# Patient Record
Sex: FEMALE | Race: WHITE | Employment: STUDENT | URBAN - METROPOLITAN AREA
[De-identification: names, ages, dates, MRNs, and addresses within clinical notes are randomized per-mention and may not be internally consistent; named-entity substitution may affect disease eponyms.]

---
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
## 2025-01-03 ENCOUNTER — TELEPHONE (OUTPATIENT)
Dept: PHYSICAL THERAPY | Facility: HOSPITAL | Age: 20
End: 2025-01-03

## 2025-01-08 ENCOUNTER — OFFICE VISIT (OUTPATIENT)
Dept: PHYSICAL THERAPY | Facility: HOSPITAL | Age: 20
End: 2025-01-08
Attending: STUDENT IN AN ORGANIZED HEALTH CARE EDUCATION/TRAINING PROGRAM
Payer: MEDICAID

## 2025-01-08 PROCEDURE — 97112 NEUROMUSCULAR REEDUCATION: CPT | Performed by: PHYSICAL THERAPIST

## 2025-01-08 PROCEDURE — 97110 THERAPEUTIC EXERCISES: CPT | Performed by: PHYSICAL THERAPIST

## 2025-01-08 NOTE — PROGRESS NOTES
Diagnosis:   Patellar instability of right knee (M25.361)       Referring Provider: Nnamdi Iqbal  Date of Evaluation:    12/23/2024     Precautions:  None Next MD visit:   none scheduled  Date of Surgery: n/a   Insurance Primary/Secondary: BLUE CROSS MEDICAID / N/A     # Auth Visits: 10            Subjective: Patient reports no pain today, but still has difficulty bending, putting weight on it, going down the stairs, and squatting down.     Pain: 0/10      Objective:   1/8/25  R Knee flexion ROM:  130 degrees    Assessment: Patient's main complaint is still having difficulty WB in her RLE, squatting, and descending stairs. New exercises were added today to improve her glut and quad strength so that she can be able to WB more in her RLE so that she can perform above activities. She tolerated well with all exercises without increase in pain.       Goals:   Goals: (to be met in 8 visits)  Pt will improve knee extension ROM to 0 deg to allow proper heel strike during gait and terminal knee extension in stance   Pt will improve knee AROM flexion to >120 degrees to improve ability to perform squatting    Pt will improve quad strength to 5/5 to ascend 1 flight of stairs reciprocally without UE assist   Pt will increase hip and knee strength to grossly 4+/5 to be able to get up and down from the floor safely   Pt will demonstrate increased hip ER/ABD strength to 5/5 to perform stepping and squatting activities without excessive femoral IR/ADD   Pt will be independent and compliant with comprehensive HEP to maintain progress achieved in PT      Plan: Add hip and knee strengthening.   Date: 1/8/2025  TX#: 2/10 Date:                 TX#: 3/ Date:                 TX#: 4/ Date:                 TX#: 5/ Date:   Tx#: 6/   TE 30'  Upright Bike 5 min.   Shuttle DL 50#/SL 25# 3x10 ea   Step up & over 6 in step 3x10   PROM in the R Knee   Heel slides with strap [id number] R  TRX mini squats 3x10  Hurdles FWD/Side 6 laps   Clam shells  3x10 ea bilat YTB        NMR 15'  R SLS 3 way rebound throws 30x ea   R TKE with GTB 3x10   R SLR 3x10   Side steps 3 laps YTB                      HEP: Continue with HEP given at IE.     Charges: 2 TE, & 1 NMR       Total Timed Treatment: 45 min  Total Treatment Time: 45 min

## 2025-01-10 ENCOUNTER — OFFICE VISIT (OUTPATIENT)
Dept: PHYSICAL THERAPY | Facility: HOSPITAL | Age: 20
End: 2025-01-10
Attending: STUDENT IN AN ORGANIZED HEALTH CARE EDUCATION/TRAINING PROGRAM
Payer: MEDICAID

## 2025-01-10 PROCEDURE — 97110 THERAPEUTIC EXERCISES: CPT | Performed by: PHYSICAL THERAPIST

## 2025-01-10 PROCEDURE — 97112 NEUROMUSCULAR REEDUCATION: CPT | Performed by: PHYSICAL THERAPIST

## 2025-01-10 NOTE — PROGRESS NOTES
Diagnosis:   Patellar instability of right knee (M25.361)       Referring Provider: Nnamdi Iqbal  Date of Evaluation:    12/23/2024     Precautions:  None Next MD visit:   none scheduled  Date of Surgery: n/a   Insurance Primary/Secondary: BLUE CROSS MEDICAID / N/A     # Auth Visits: 10            Subjective: Patient reports no pain today. She states no pain with bending.     Pain: 0/10      Objective:     1/10/25  R Knee flexion ROM:  130 degrees  1/8/25  R Knee flexion ROM:  130 degrees    Assessment: New exercises and exercises were progressed today to further improve her glut and quad strength so that she can be able to ambulate and descend stairs with less difficulty. She tolerated well with all exercises without increase in pain.       Goals:   Goals: (to be met in 8 visits)  Pt will improve knee extension ROM to 0 deg to allow proper heel strike during gait and terminal knee extension in stance   Pt will improve knee AROM flexion to >120 degrees to improve ability to perform squatting    Pt will improve quad strength to 5/5 to ascend 1 flight of stairs reciprocally without UE assist   Pt will increase hip and knee strength to grossly 4+/5 to be able to get up and down from the floor safely   Pt will demonstrate increased hip ER/ABD strength to 5/5 to perform stepping and squatting activities without excessive femoral IR/ADD   Pt will be independent and compliant with comprehensive HEP to maintain progress achieved in PT      Plan: Add hip and knee strengthening.   Date: 1/8/2025  TX#: 2/10 Date: 1/10/25  TX#: 3/10 Date:                 TX#: 4/ Date:                 TX#: 5/ Date:   Tx#: 6/   TE 30'  Upright Bike 5 min.   Shuttle DL 50#/SL 25# 3x10 ea   Step up & over 6 in step 3x10   PROM in the R Knee   Heel slides with strap [id number] R  TRX mini squats 3x10  Hurdles FWD/Side 6 laps   Clam shells 3x10 ea bilat YTB  TE 30'  Upright Bike 5 min.   Shuttle DL 62#/SL 37# 3x10 ea   Lateral step down 4 in 3x10    Heel slides with strap [id number] R   TRX mini squats 3x10  Hurdles FWD/Side 6 laps   Clam shells 3x10 ea bilat RTB       NMR 15'  R SLS 3 way rebound throws 30x ea   R TKE with GTB 3x10   R SLR 3x10   Side steps 3 laps YTB  NMR 15'  R SLS + Blaze pods   R TKE with GTB 3x10   R SLR 3x10   Side steps 6 laps RTB  Step ups on Bosu ball side up FWD/Side 3x10 ea                      HEP: Continue with HEP given at IE.     Charges: 2 TE, & 1 NMR       Total Timed Treatment: 45 min  Total Treatment Time: 45 min

## 2025-01-15 ENCOUNTER — OFFICE VISIT (OUTPATIENT)
Dept: PHYSICAL THERAPY | Facility: HOSPITAL | Age: 20
End: 2025-01-15
Attending: STUDENT IN AN ORGANIZED HEALTH CARE EDUCATION/TRAINING PROGRAM
Payer: MEDICAID

## 2025-01-15 PROCEDURE — 97110 THERAPEUTIC EXERCISES: CPT | Performed by: PHYSICAL THERAPIST

## 2025-01-15 PROCEDURE — 97530 THERAPEUTIC ACTIVITIES: CPT | Performed by: PHYSICAL THERAPIST

## 2025-01-15 PROCEDURE — 97112 NEUROMUSCULAR REEDUCATION: CPT | Performed by: PHYSICAL THERAPIST

## 2025-01-15 NOTE — PROGRESS NOTES
Diagnosis:   Patellar instability of right knee (M25.361)       Referring Provider: Nnamdi Iqbal  Date of Evaluation:    12/23/2024     Precautions:  None Next MD visit:   none scheduled  Date of Surgery: n/a   Insurance Primary/Secondary: BLUE CROSS MEDICAID / N/A     # Auth Visits: 10            Subjective: Patient reports overall feeling \"75% better\".     Pain: 0/10      Objective:     1/10/25  R Knee flexion ROM:  130 degrees  1/8/25  R Knee flexion ROM:  130 degrees    Assessment: Patient has benefited well in PT is now able to achieve 130 degrees of knee flexion without pain. However, she is still having pain with deep squatting, lunging, and sitting for prolonged periods of time. She would also like to return back to running in the near future. New lunges exercise was added today to further improve her glut and quad strength so that she can be able to perform above activities with less pain and difficulty. She tolerated well with all exercises without increase in pain. Valgus stress was evident while performing TRX exercises and lateral step down so visual cues were required.       Goals:   Goals: (to be met in 8 visits)  Pt will improve knee extension ROM to 0 deg to allow proper heel strike during gait and terminal knee extension in stance   Pt will improve knee AROM flexion to >120 degrees to improve ability to perform squatting    Pt will improve quad strength to 5/5 to ascend 1 flight of stairs reciprocally without UE assist   Pt will increase hip and knee strength to grossly 4+/5 to be able to get up and down from the floor safely   Pt will demonstrate increased hip ER/ABD strength to 5/5 to perform stepping and squatting activities without excessive femoral IR/ADD   Pt will be independent and compliant with comprehensive HEP to maintain progress achieved in PT      Plan: Add hip and knee strengthening.   Date: 1/8/2025  TX#: 2/10 Date: 1/10/25  TX#: 3/10 Date: 1/15/25   TX#: 4/10 Date:                  TX#: 5/ Date:   Tx#: 6/   TE 30'  Upright Bike 5 min.   Shuttle DL 50#/SL 25# 3x10 ea   Step up & over 6 in step 3x10   PROM in the R Knee   Heel slides with strap [id number] R  TRX mini squats 3x10  Hurdles FWD/Side 6 laps   Clam shells 3x10 ea bilat YTB  TE 30'  Upright Bike 5 min.   Shuttle DL 62#/SL 37# 3x10 ea   Lateral step down 4 in 3x10   Heel slides with strap [id number] R   TRX mini squats 3x10  Hurdles FWD/Side 6 laps   Clam shells 3x10 ea bilat RTB  TE 20'  Upright Bike 5 min.   Shuttle #/SL 62# 3x10 ea    Standing hip ABD and extension 2x10 ea bilat RTB  Clam shells 3x10 ea bilat RTB        NMR 15'  R SLS 3 way rebound throws 30x ea   R TKE with GTB 3x10   R SLR 3x10   Side steps 3 laps YTB  NMR 15'  R SLS + Blaze pods   R TKE with GTB 3x10   R SLR 3x10   Side steps 6 laps RTB  Step ups on Bosu ball side up FWD/Side 3x10 ea   NMR 15'  R SLS + 3 way rebound catch  R TKE with GTB [id number]  R SLR 3x10   Side steps 6 laps RTB  Step ups on Bosu ball side up FWD/Side 3x10 ea         TA 10'  Lateral step down 8 in 3x10 R  TRX squats DL and SL 3x10 ea   Lunge on Bosu ball side up 10x ea bilat.             HEP: Continue with HEP given at IE.     Charges: 1 TE, 1 NMR, 1 TA       Total Timed Treatment: 45 min  Total Treatment Time: 45 min

## 2025-01-17 ENCOUNTER — APPOINTMENT (OUTPATIENT)
Dept: PHYSICAL THERAPY | Facility: HOSPITAL | Age: 20
End: 2025-01-17
Attending: STUDENT IN AN ORGANIZED HEALTH CARE EDUCATION/TRAINING PROGRAM
Payer: MEDICAID

## 2025-01-22 ENCOUNTER — OFFICE VISIT (OUTPATIENT)
Dept: PHYSICAL THERAPY | Facility: HOSPITAL | Age: 20
End: 2025-01-22
Attending: STUDENT IN AN ORGANIZED HEALTH CARE EDUCATION/TRAINING PROGRAM
Payer: MEDICAID

## 2025-01-22 PROCEDURE — 97110 THERAPEUTIC EXERCISES: CPT | Performed by: PHYSICAL THERAPIST

## 2025-01-22 PROCEDURE — 97112 NEUROMUSCULAR REEDUCATION: CPT | Performed by: PHYSICAL THERAPIST

## 2025-01-22 PROCEDURE — 97530 THERAPEUTIC ACTIVITIES: CPT | Performed by: PHYSICAL THERAPIST

## 2025-01-22 NOTE — PROGRESS NOTES
Diagnosis:   Patellar instability of right knee (M25.361)       Referring Provider: Nnamdi Iqbal  Date of Evaluation:    12/23/2024     Precautions:  None Next MD visit:   none scheduled  Date of Surgery: n/a   Insurance Primary/Secondary: BLUE CROSS MEDICAID / N/A     # Auth Visits: 10            Subjective: Patient reports overall feeling \"80-85% better\".     Pain: 0/10      Objective:     1/10/25  R Knee flexion ROM:  130 degrees  1/8/25  R Knee flexion ROM:  130 degrees    Assessment: Patient has benefited well in PT with improved knee ROM. However, she is still having pain with deep lunging, walking for prolonged periods of time, and jogging. New exercises were added today to further improve her glut and quad strength so that she can be able to perform above activities with less pain and difficulty. She tolerated well with all exercises without increase in pain. Mirror and visual cues were required to prevent valgus stress.        Goals:   Goals: (to be met in 8 visits)  Pt will improve knee extension ROM to 0 deg to allow proper heel strike during gait and terminal knee extension in stance   Pt will improve knee AROM flexion to >120 degrees to improve ability to perform squatting    Pt will improve quad strength to 5/5 to ascend 1 flight of stairs reciprocally without UE assist   Pt will increase hip and knee strength to grossly 4+/5 to be able to get up and down from the floor safely   Pt will demonstrate increased hip ER/ABD strength to 5/5 to perform stepping and squatting activities without excessive femoral IR/ADD   Pt will be independent and compliant with comprehensive HEP to maintain progress achieved in PT      Plan: Add hip and knee strengthening.   Date: 1/8/2025  TX#: 2/10 Date: 1/10/25  TX#: 3/10 Date: 1/15/25   TX#: 4/10 Date: 1/22/25         TX#: 5/10 Date:   Tx#: 6/   TE 30'  Upright Bike 5 min.   Shuttle DL 50#/SL 25# 3x10 ea   Step up & over 6 in step 3x10   PROM in the R Knee   Heel slides  with strap [id number] R  TRX mini squats 3x10  Hurdles FWD/Side 6 laps   Clam shells 3x10 ea bilat YTB  TE 30'  Upright Bike 5 min.   Shuttle DL 62#/SL 37# 3x10 ea   Lateral step down 4 in 3x10   Heel slides with strap [id number] R   TRX mini squats 3x10  Hurdles FWD/Side 6 laps   Clam shells 3x10 ea bilat RTB  TE 20'  Upright Bike 5 min.   Shuttle #/SL 62# 3x10 ea    Standing hip ABD and extension 2x10 ea bilat RTB  Clam shells 3x10 ea bilat RTB    TE 20'  Treadmill 5 min.   Shuttle #/SL 75# 3x10 ea    Standing hip ABD and extension 3x10 ea bilat GTB  Clam shells 3x10 ea bilat GTB     NMR 15'  R SLS 3 way rebound throws 30x ea   R TKE with GTB 3x10   R SLR 3x10   Side steps 3 laps YTB  NMR 15'  R SLS + Blaze pods   R TKE with GTB 3x10   R SLR 3x10   Side steps 6 laps RTB  Step ups on Bosu ball side up FWD/Side 3x10 ea   NMR 15'  R SLS + 3 way rebound catch  R TKE with GTB [id number]  R SLR 3x10   Side steps 6 laps RTB  Step ups on Bosu ball side up FWD/Side 3x10 ea   NMR 15'  R SLS + 3 way blaze pods taps  Step ups on Bosu ball side up FWD/Side 3x10 ea    Malay split squat 3x10   Light jogging with agility ladder       TA 10'  Lateral step down 8 in 3x10 R  TRX squats DL and SL 3x10 ea   Lunge on Bosu ball side up 10x ea bilat.  TA 10'  Lateral step down 8 in 3x10 R  TRX squats DL on Bosu ball side down and SL 3x10 ea   Lunges 3 laps with mirror cues              HEP: Continue with HEP given at IE.     Charges: 1 TE, 1 NMR, 1 TA       Total Timed Treatment: 45 min  Total Treatment Time: 45 min

## 2025-02-05 ENCOUNTER — OFFICE VISIT (OUTPATIENT)
Dept: PHYSICAL THERAPY | Facility: HOSPITAL | Age: 20
End: 2025-02-05
Attending: STUDENT IN AN ORGANIZED HEALTH CARE EDUCATION/TRAINING PROGRAM
Payer: MEDICAID

## 2025-02-05 PROCEDURE — 97110 THERAPEUTIC EXERCISES: CPT | Performed by: PHYSICAL THERAPIST

## 2025-02-05 PROCEDURE — 97530 THERAPEUTIC ACTIVITIES: CPT | Performed by: PHYSICAL THERAPIST

## 2025-02-05 PROCEDURE — 97112 NEUROMUSCULAR REEDUCATION: CPT | Performed by: PHYSICAL THERAPIST

## 2025-02-05 NOTE — PROGRESS NOTES
Discharge Summary  Pt has attended 6 visits in Physical Therapy.    Diagnosis:   Patellar instability of right knee (M25.361)       Referring Provider: Nnamdi Iqbal  Date of Evaluation:    12/23/2024     Precautions:  None Next MD visit:   none scheduled  Date of Surgery: n/a   Insurance Primary/Secondary: BLUE CROSS MEDICAID / N/A     # Auth Visits: 10            Subjective: Patient reports improved pain since starting PT.     Pain: 0/10      Objective:     Observation: Valgus stress while squatting (Improved)   Palpation: TTP in the R medial and inferior knee   Sensation: bilat light touch is intact      AROM: (* denotes performed with pain)  Hip Knee Foot/Ankle   ROM is WFL  Flexion: R 135; L 130  Extension: R 0; L 0    ROM is WFL       Accessory motion: Hypomobility with patella mob all directions      Flexibility:  Hamstrings: R mod restrictions; L min restrictions         Strength/MMT: (* denotes performed with pain)  Hip Knee   Flexion: R 5/5; L 5/5  Extension: R 5/5; L 5/5  Abduction: R 5/5; L 5/5  ER: R 5/5; L 5/5  IR: R 5/5; L 5/5 Flexion: R 5/5; L 5/5  Extension: R 5/5; L 5/5           Gait: pt ambulates on level ground   Balance: SLS: R >30 sec, L >30 sec    Assessment: Patient has benefited well in PT with improved knee ROM, strength, and pain. Due to improvements, she is able to perform all ADLs without pain and difficulty. She has achieved all goals and is ready for discharge at this time.     Goals:   Goals: (to be met in 8 visits)  Pt will improve knee extension ROM to 0 deg to allow proper heel strike during gait and terminal knee extension in stance (Met)  Pt will improve knee AROM flexion to >120 degrees to improve ability to perform squatting  (Met)  Pt will improve quad strength to 5/5 to ascend 1 flight of stairs reciprocally without UE assist (Met)  Pt will increase hip and knee strength to grossly 4+/5 to be able to get up and down from the floor safely (Met)  Pt will demonstrate increased  hip ER/ABD strength to 5/5 to perform stepping and squatting activities without excessive femoral IR/ADD (Met)  Pt will be independent and compliant with comprehensive HEP to maintain progress achieved in PT  (Met)    Post LEFS Score  Post LEFS Score: (Patient-Rptd) 100 % (2/5/2025  7:37 AM)    36.25 % improvement    Plan: Discharged with HEP.     Patient/Family/Caregiver was advised of these findings, precautions, and treatment options and has agreed to actively participate in planning and for this course of care.    Thank you for your referral. If you have any questions, please contact me at Dept: [phone].    Sincerely,  Electronically signed by therapist: Joshua Baumann, PT       Date: 1/8/2025  TX#: 2/10 Date: 1/10/25  TX#: 3/10 Date: 1/15/25   TX#: 4/10 Date: 1/22/25         TX#: 5/10 Date: 2/5/25  Tx#: 6/10   TE 30'  Upright Bike 5 min.   Shuttle DL 50#/SL 25# 3x10 ea   Step up & over 6 in step 3x10   PROM in the R Knee   Heel slides with strap [id number] R  TRX mini squats 3x10  Hurdles FWD/Side 6 laps   Clam shells 3x10 ea bilat YTB  TE 30'  Upright Bike 5 min.   Shuttle DL 62#/SL 37# 3x10 ea   Lateral step down 4 in 3x10   Heel slides with strap [id number] R   TRX mini squats 3x10  Hurdles FWD/Side 6 laps   Clam shells 3x10 ea bilat RTB  TE 20'  Upright Bike 5 min.   Shuttle #/SL 62# 3x10 ea    Standing hip ABD and extension 2x10 ea bilat RTB  Clam shells 3x10 ea bilat RTB    TE 20'  Treadmill 5 min.   Shuttle #/SL 75# 3x10 ea    Standing hip ABD and extension 3x10 ea bilat GTB  Clam shells 3x10 ea bilat GTB  TE 20'  Treadmill (2 min. Walk & 1 min. Jog) 6 min. Total   Shuttle #/SL 75# 3x10 ea    Standing hip ABD and extension 3x10 ea bilat GTB  Clam shells 3x10 ea bilat GTB    NMR 15'  R SLS 3 way rebound throws 30x ea   R TKE with GTB 3x10   R SLR 3x10   Side steps 3 laps YTB  NMR 15'  R SLS + Blaze pods   R TKE with GTB 3x10   R SLR 3x10   Side steps 6 laps RTB  Step ups on Bosu ball side up  FWD/Side 3x10 ea   NMR 15'  R SLS + 3 way rebound catch  R TKE with GTB [id number]  R SLR 3x10   Side steps 6 laps RTB  Step ups on Bosu ball side up FWD/Side 3x10 ea   NMR 15'  R SLS + 3 way blaze pods taps  Step ups on Bosu ball side up FWD/Side 3x10 ea    Australian split squat 3x10   Light jogging with agility ladder  NMR 10'  R SLS + 3 ball toss   Step ups on Bosu ball side up FWD/Side 3x10 ea    Australian split squat 3x10        TA 10'  Lateral step down 8 in 3x10 R  TRX squats DL and SL 3x10 ea   Lunge on Bosu ball side up 10x ea bilat.  TA 10'  Lateral step down 8 in 3x10 R  TRX squats DL on Bosu ball side down and SL 3x10 ea   Lunges 3 laps with mirror cues    TA 10'  Lateral step down 8 in 3x10 R  TRX squats DL on Bosu ball side down and SL 3x10 ea   Lunges 3 laps with mirror cues             HEP: Continue with HEP given at IE.     Charges: 1 TE, 1 NMR, 1 TA       Total Timed Treatment: 40 min  Total Treatment Time: 40 min

## 2025-02-05 NOTE — TELEPHONE ENCOUNTER
Mom contacted. Sore throat and congestion x2 days   Worse with swallowing. Gave Tylenol - no relief. C/o occasional headache. Per mom, has a little bit of a runny nose. No fever - Tmax 99.8 - tympanic   No cough. Eating and drinking a little. Interacting appropriately. Supportive care measures discussed per triage protocol. Mom requesting antibiotics. Advised mom to go to  to be seen due to no availability in office today or tomorrow. Advised mom to call with further concerns and/or worsening of symptoms. Mom frustrated, but verbalized understanding. bedside

## 2025-05-12 RX ORDER — TRETINOIN 0.5 MG/G
CREAM TOPICAL
Qty: 20 G | Refills: 3 | Status: SHIPPED | OUTPATIENT
Start: 2025-05-12

## 2025-05-12 NOTE — TELEPHONE ENCOUNTER
Refill Request for medication(s):     Last Office Visit: 12/7/2024    Last Refill: 12/7/2024    Pharmacy, Dosage verified: yes    Condition Update (if applicable):     Rx pended and sent to provider for approval, please advise. Thank You!

## 2025-08-19 ENCOUNTER — TELEPHONE (OUTPATIENT)
Dept: FAMILY MEDICINE CLINIC | Facility: CLINIC | Age: 20
End: 2025-08-19

## 2025-08-20 ENCOUNTER — OFFICE VISIT (OUTPATIENT)
Dept: FAMILY MEDICINE CLINIC | Facility: CLINIC | Age: 20
End: 2025-08-20

## 2025-08-20 VITALS
WEIGHT: 140.81 LBS | HEART RATE: 76 BPM | BODY MASS INDEX: 24.95 KG/M2 | DIASTOLIC BLOOD PRESSURE: 74 MMHG | HEIGHT: 63 IN | SYSTOLIC BLOOD PRESSURE: 116 MMHG

## 2025-08-20 DIAGNOSIS — B00.1 COLD SORE: ICD-10-CM

## 2025-08-20 DIAGNOSIS — R63.2 BINGE EATING: ICD-10-CM

## 2025-08-20 DIAGNOSIS — Z11.3 SCREENING FOR VENEREAL DISEASE: ICD-10-CM

## 2025-08-20 DIAGNOSIS — L21.0 DANDRUFF: ICD-10-CM

## 2025-08-20 DIAGNOSIS — Z76.89 ENCOUNTER TO ESTABLISH CARE: Primary | ICD-10-CM

## 2025-08-20 PROCEDURE — 99203 OFFICE O/P NEW LOW 30 MIN: CPT | Performed by: FAMILY MEDICINE

## 2025-08-20 RX ORDER — KETOCONAZOLE 20 MG/ML
1 SHAMPOO, SUSPENSION TOPICAL
Qty: 120 ML | Refills: 4 | Status: SHIPPED | OUTPATIENT
Start: 2025-08-21

## 2025-08-20 RX ORDER — VALACYCLOVIR HYDROCHLORIDE 500 MG/1
500 TABLET, FILM COATED ORAL DAILY
Qty: 30 TABLET | Refills: 0 | Status: SHIPPED | OUTPATIENT
Start: 2025-08-20

## (undated) NOTE — LETTER
Date & Time: 10/21/2019, 10:59 PM  Patient: Trish Segura  Encounter Provider(s):    On File, BAILEY BERG Attending  DARREL Kumar       To Whom It May Concern:    Trish Segura was seen and treated in our department on 10/21/2019.      Nena Estrada will

## (undated) NOTE — LETTER
Date & Time: 10/21/2019, 10:50 PM  Patient: Stefan Amaya  Encounter Provider(s):    On File, E MORENA Attending  DARREL Harris       To Whom It May Concern:    Stefan Amaya was seen and treated in our department on 10/21/2019.  She should not

## (undated) NOTE — LETTER
11/19/2024          To Whom It May Concern:    Brie Wu is currently under my medical care She may return to work on 11/20/2024.    Activity is restricted as follows: please allow her to use knee brace at all times and to rest every 15 min per every 15 minutes standing and walking, no loaded ambulation.    If you require additional information please contact our office.        Sincerely,    Nnamdi Iqbal MD

## (undated) NOTE — LETTER
Date & Time: 11/17/2024, 5:40 AM  Patient: Brie Wu  Encounter Provider(s):    Ange Hutchison DO       To Whom It May Concern:    Brie Wu was seen and treated in our department on 11/17/2024. She should not return to work until 11/19/2024 .    If you have any questions or concerns, please do not hesitate to call.        _____________________________  Physician/APC Signature

## (undated) NOTE — LETTER
VACCINE ADMINISTRATION RECORD  PARENT / GUARDIAN APPROVAL  Date: 3/14/2022  Vaccine administered to: Emi Vang     : 7/15/2005    MRN: [id number]    A copy of the appropriate Centers for Disease Control and Prevention Vaccine Information statement has been provided. I have read or have had explained the information about the diseases and the vaccines listed below. There was an opportunity to ask questions and any questions were answered satisfactorily. I believe that I understand the benefits and risks of the vaccine cited and ask that the vaccine(s) listed below be given to me or to the person named above (for whom I am authorized to make this request). VACCINES ADMINISTERED:  Menveo    I have read and hereby agree to be bound by the terms of this agreement as stated above. My signature is valid until revoked by me in writing. This document is signed by , relationship: Mother on 3/14/2022.:                                                                                                                                         Parent / Dasie Formosa                                                Date    Shannon Whitehead served as a witness to authentication that the identity of the person signing electronically is in fact the person represented as signing. This document was generated by Shannon Whitehead on 3/14/2022.

## (undated) NOTE — LETTER
Name:  Arianna Muñoz Year:  9th Grade Class: Student ID No.:   Address:  [address] [place] [id number] Phone:  [phone] (home) [phone] (work) :  15year old   Name Relationship Lgl CtraMaverick Pennington 3 Work 1jiajie Ph HEART HEALTH QUESTIONS ABOUT YOUR FAMILY Yes No   13.  Has any family member or relative  of heart problems or had an unexpected or unexplained sudden death before age 48?     15. Does anyone in your family have hypertrophic cardiomyopathy, Marfan syndr 32. Do you have any rashes, pressure sores, or other skin problems? 35. Have you had a herpes or MRSA skin infection? 29. Have you ever had a head injury or concussion?      35. Have you ever had a hit or blow to the head that caused confusion, prol 10/15/2019   BMI 20.55 kg/m²  63 %ile (Z= 0.33) based on CDC (Girls, 2-20 Years) BMI-for-age based on BMI available as of 11/7/2019. female    Vision: R 20/    L 20/   Corrected:   Yes/No   MEDICAL NORMAL ABNORMAL FINDINGS   Appearance:  Marfan stigmata (k [de-identified] Assistants or Advanced Nurse Practitioners to sign off on physicals.     [facility] Substance Testing Policy Consent to Random Testing   (This section for high school students only)   9073-9473 school term     As a prerequisite to participation in [facility]

## (undated) NOTE — LETTER
Name:  Sonia Nix Year:  8th Grade Class: Student ID No.:   Address:  [place] [id number] Phone:  [phone] (home)  :  15year old   Name Relationship Lgl [address] 3 Work Phone Home Phone Mobile Phone   1.  AMAYA GARCIA 15. Does anyone in your family have hypertrophic cardiomyopathy, Marfan syndrome, arrhythmogenic right ventricular cardiomyopathy, long QT syndrome, short QT syndrome, Brugada syndrome, or catecholaminergic polymorphic ventricular tachycardia?      15. Does 35. Have you ever had a hit or blow to the head that caused confusion, prolonged headache, or memory problems? 36. Do you have a history of seizure disorder?     37. Do you have headaches with exercise?      38. Have you ever had numbness, tingling, or MEDICAL NORMAL ABNORMAL FINDINGS   Appearance:  Marfan stigmata (kyphoscoliosis, high-arched palate, pectus excavatum,      arachnodactyly, arm span > height, hyperlaxity, myopia, MVP, aortic insufficiency) Yes    Eyes/Ears/Nose/Throat:  Pupils equal    He 3813-1098 school term     As a prerequisite to participation in [employer] athletic activities, we agree that I/our student will not use performance-enhancing substances as defined in the [facility] Performance-Enhancing Substance Testing Program Protocol.  We have rev

## (undated) NOTE — LETTER
[address] [facility]       Student's Name  Mila Krishnamurthy Birth Title                           Date  10/18/18   Signature Grade Level/ID#  9th Grade   HEALTH HISTORY          TO BE COMPLETED AND SIGNED BY PARENT/GUARDIAN AND VERIFIED BY HEALTH CARE PROVIDER    ALLERGIES  (Food, drug, insect, other) MEDICATION  (List all prescribed or taken on a regular basis.)     Diagnosis /72   Pulse 99   Ht 5' 2.75\" (1.594 m)   Wt 47.9 kg (105 lb 9.6 oz)   BMI 18.86 kg/m²     DIABETES SCREENING  BMI>85% age/sex  No And any two of the following:  Family History No   Ethnic Minority  No          Signs of Insulin Resistance (hypertensi Yes        Currently Prescribed Asthma Medication:            Quick-relief  medication (e.g. Short Acting Beta Antagonist): No          Controller medication (e.g. inhaled corticosteroid):   No Other   NEEDS/MODIFICATIONS required in the school setting  No

## (undated) NOTE — LETTER
Name:  Benji Bui Year:  10th Grade Class: Student ID No.:   Address:  [address]  [address] [place] [id number] Phone:  [phone] (home) [phone] (work) :  13year old   Name Relationship Lgl 69 Jose Melendez 13. Does anyone in your family have a heart problem, pacemaker, or implanted defibrillator? 12. Has anyone in your family had unexplained fainting, seizures, or near drowning?      BONE AND JOINT QUESTIONS Yes No   17. Have you ever had an injury to a b after being hit or falling? 39.Have you ever been unable to move your arms / legs after being hit /fall? 40. Have you ever become ill while exercising in the heat?     41. Do you get frequent muscle cramps when exercising? 42.  Do you or someone · Murmurs (auscultation standing, supine, +/- Valsalva)  · Location of point of maximal impulse (PMI) Yes    Pulses Yes    Lungs Yes    Abdomen Yes    Genitourinary (males only)* N/A    Skin:  HSV, lesions suggestive of MRSA, tinea corporis Yes    Neurolog may be asked to submit to testing for the presence of performance-enhancing substances in my/his/her body either during [employer] state series events or during the school day, and I/our student do/does hereby agree to submit to such testing and analysis by a ce

## (undated) NOTE — LETTER
11/19/2024          To Whom It May Concern:    Brie Wu is currently under my medical care.    Please excuse Brie from gym class due to her medical condition until further notice.     If you require additional information please contact our office.        Sincerely,    Nnamdi Iqbal MD

## (undated) NOTE — LETTER
2/15/2018              Chava Solo        [address]         To Whom It May Concern,    Please excuse Jennifer Carter from gym and volleyball until 2/26 due to a thumb injury.     Sincerely,    Kurt Lowery MD  [facility]

## (undated) NOTE — ED AVS SNAPSHOT
Rukhsana Ruiz   MRN: [id number]    Department:  [facility] Emergency Department   Date of Visit:  10/21/2019           Disclosure     Insurance plans vary and the physician(s) referred by the ER may not be covered by your plan.  Please contac CARE PHYSICIAN AT ONCE OR RETURN IMMEDIATELY TO THE EMERGENCY DEPARTMENT. If you have been prescribed any medication(s), please fill your prescription right away and begin taking the medication(s) as directed.   If you believe that any of the medications

## (undated) NOTE — LETTER
[facility] of [address] [place] [address] [facility] of Child Health Examination       Student's Name  Yoselynkristy Nehemias Moreno Title             MD              Date  11/7/2019   Signature Female School   Grade Level/ID#  9th Grade   HEALTH HISTORY          TO BE COMPLETED AND SIGNED BY PARENT/GUARDIAN AND VERIFIED BY HEALTH CARE PROVIDER    ALLERGIES  (Food, drug, insect, other)  Patient has no known allergies.  MEDICATION  (List all prescri reading) Dental:  ____Braces    ____Bridge    ____Plate    ____Other  Other concerns? Ear/Hearing problems? Yes   No  Information may be shared with appropriate personnel for health /educational purposes. Bone/Joint problem/injury/scoliosis?    Yes Hemoglobin or Hematocrit   Sickle Cell  (when indicated)     Urinalysis   Developmental Screening Tool     SYSTEM REVIEW Normal Comments/Follow-up/Needs  Normal Comments/Follow-up/Needs   Skin Yes  Endocrine Yes    Ears Yes                      Screen resu Date  11/7/2019   Address/Phone  [address] [facility]  [address]  [address] [facility] [address]  [phone]   Rev 11/15